# Patient Record
Sex: MALE | Race: WHITE | NOT HISPANIC OR LATINO | URBAN - METROPOLITAN AREA
[De-identification: names, ages, dates, MRNs, and addresses within clinical notes are randomized per-mention and may not be internally consistent; named-entity substitution may affect disease eponyms.]

---

## 2021-03-21 ENCOUNTER — OUTPATIENT (OUTPATIENT)
Dept: OUTPATIENT SERVICES | Facility: HOSPITAL | Age: 36
LOS: 1 days | End: 2021-03-21
Payer: COMMERCIAL

## 2021-03-21 DIAGNOSIS — Z11.52 ENCOUNTER FOR SCREENING FOR COVID-19: ICD-10-CM

## 2021-03-21 LAB — SARS-COV-2 RNA SPEC QL NAA+PROBE: SIGNIFICANT CHANGE UP

## 2021-03-21 PROCEDURE — C9803: CPT

## 2021-03-21 PROCEDURE — U0005: CPT

## 2021-03-21 PROCEDURE — U0003: CPT

## 2021-12-30 ENCOUNTER — TRANSCRIPTION ENCOUNTER (OUTPATIENT)
Age: 36
End: 2021-12-30

## 2023-02-22 ENCOUNTER — OFFICE (OUTPATIENT)
Dept: URBAN - METROPOLITAN AREA CLINIC 90 | Facility: CLINIC | Age: 38
Setting detail: OPHTHALMOLOGY
End: 2023-02-22
Payer: COMMERCIAL

## 2023-02-22 DIAGNOSIS — H16.221: ICD-10-CM

## 2023-02-22 PROCEDURE — 92002 INTRM OPH EXAM NEW PATIENT: CPT | Performed by: OPHTHALMOLOGY

## 2023-02-22 ASSESSMENT — REFRACTION_CURRENTRX
OS_SPHERE: -2.50
OS_CYLINDER: -0.50
OS_AXIS: 027
OD_SPHERE: -2.75
OD_OVR_VA: 20/
OD_VPRISM_DIRECTION: SV
OS_VPRISM_DIRECTION: SV
OS_OVR_VA: 20/

## 2023-02-22 ASSESSMENT — SUPERFICIAL PUNCTATE KERATITIS (SPK)
OD_SPK: 2+
OS_SPK: ABSENT

## 2023-02-22 ASSESSMENT — VISUAL ACUITY
OD_BCVA: 20/20
OS_BCVA: 20/20

## 2023-11-15 ENCOUNTER — EMERGENCY (EMERGENCY)
Facility: HOSPITAL | Age: 38
LOS: 1 days | Discharge: HOME | End: 2023-11-15
Attending: EMERGENCY MEDICINE
Payer: COMMERCIAL

## 2023-11-15 VITALS
TEMPERATURE: 98 F | RESPIRATION RATE: 20 BRPM | OXYGEN SATURATION: 97 % | DIASTOLIC BLOOD PRESSURE: 85 MMHG | SYSTOLIC BLOOD PRESSURE: 134 MMHG | WEIGHT: 210.1 LBS | HEART RATE: 70 BPM | HEIGHT: 75 IN

## 2023-11-15 VITALS
TEMPERATURE: 98 F | RESPIRATION RATE: 19 BRPM | SYSTOLIC BLOOD PRESSURE: 144 MMHG | OXYGEN SATURATION: 98 % | DIASTOLIC BLOOD PRESSURE: 89 MMHG | HEART RATE: 69 BPM

## 2023-11-15 PROCEDURE — 99285 EMERGENCY DEPT VISIT HI MDM: CPT

## 2023-11-15 PROCEDURE — 99284 EMERGENCY DEPT VISIT MOD MDM: CPT | Mod: 25

## 2023-11-15 PROCEDURE — 93971 EXTREMITY STUDY: CPT | Mod: 26,LT

## 2023-11-15 PROCEDURE — 93971 EXTREMITY STUDY: CPT

## 2023-11-15 PROCEDURE — 73590 X-RAY EXAM OF LOWER LEG: CPT | Mod: 26,LT

## 2023-11-15 PROCEDURE — 73590 X-RAY EXAM OF LOWER LEG: CPT

## 2023-11-15 NOTE — ED PROVIDER NOTE - PATIENT PORTAL LINK FT
You can access the FollowMyHealth Patient Portal offered by [facility] by registering at the following website: [email or web address]. By joining [employer]’s FollowMyHealth portal, you will also be able to view your health information using other applications (apps) compatible with our system.

## 2023-11-15 NOTE — ED PROCEDURE NOTE - ULTRASOUND FINDINGS
No signs of achilles tendon rupture, no regions of anechoicity along tendon/List any findings No signs of achilles tendon rupture, no regions of anechoicity along tendon. Upon re-examiation of calf, possible Thompson Allyssa lesion/List any findings

## 2023-11-15 NOTE — ED PROVIDER NOTE - DIFFERENTIAL DIAGNOSIS
muscle tear vs partial achilles tendon rupture (unlikely) vs avulsion fx vs dvt Differential Diagnosis

## 2023-11-15 NOTE — ED ADULT NURSE NOTE - OBJECTIVE STATEMENT
Patient c/o pain in left calf and foot s/p atraumatic injury 2 weeks ago. Patient states he was playing basketball when he had a sudden pain in L calf. Patient states he was starting to feel better but then yesterday tried running and felt pain again and this morning noticed redness in calf and bruising on L shin. Patient reports pain when walking. Denies sob, chest pain, fever, chills, n/v/d. Patient A&Ox4, ambulatory. No signs of acute distress at this time. Plan of care in progress.

## 2023-11-15 NOTE — ED PROCEDURE NOTE - ATTENDING APP SHARED VISIT CONTRIBUTION OF CARE
Dr. Seo: I performed a face to face bedside interview with patient regarding history of present illness, review of symptoms and past medical history. I completed an independent physical exam.  I have discussed patient's plan of care with PA.   I agree with note as stated above, having amended the EMR as needed to reflect my findings.   This includes HISTORY OF PRESENT ILLNESS, HIV, PAST MEDICAL/SURGICAL/FAMILY/SOCIAL HISTORY, ALLERGIES AND HOME MEDICATIONS, REVIEW OF SYSTEMS, PHYSICAL EXAM, and any PROGRESS NOTES during the time I functioned as the attending physician for this patient.

## 2023-11-15 NOTE — ED PROVIDER NOTE - OBJECTIVE STATEMENT
39 yo M w no PMHx presents to the ED c.o L sided calf pain for 2 weeks after playing basketball. Pt states he was playing basketball, made a lifting/jumping off motion when he noticed severe crampy, achy, sharp pain in 39 yo M w no PMHx presents to the ED c.o L sided calf pain for 2 weeks after playing basketball. Pt states he was playing basketball, made a lifting/jumping off motion when he noticed severe crampy, achy, sharp pain in left calf. Immediately went home, rested, iced, compression stocking with mild improvement of pain symptoms and was able to walk. On Sunday, attempted to run but severe pain returned in L calf, and this morning noticed associated bruising in anterior shin and calf prompting visit to ED. Painful when he dorsiflexes, but not painful with plantarflexion. Ibuprofen 600mg moderately improves pain. Denies fevers, chills, nausea, vomiting, dizziness, chest pain, SOB, abdominal pain, dysuria, hematuria.

## 2023-11-15 NOTE — ED PROVIDER NOTE - PROGRESS NOTE DETAILS
Alejandro Calderón MD: Signed out pending ultrasound.  Ultrasound read states patient with possible synovial cyst, however based on my clinical evaluation including physical exam and bedside ultrasound is more concerning for a significant fluid collection in between the medial gastrocnemius and the soleus, hematoma/seroma versus a Thompson Allyssa lesion.  I recommended for patient to obtain further advanced imaging including CT scan as well as obtain labs, but patient and mother bedside declined and would prefer to follow-up as an outpatient.  They state they can follow-up closely with a surgeon who would be able to manage this as well as IR to drain, as mother works for interventional radiology.  Patient and mother show that they have capacity to make medical decisions and are aware of the risks and consequences of leaving without definitive diagnosis and management.  Repeat examination shows patient has neurovascularly intact examination with brisk cap refill, equal pulses and normal sensation/motor. Patient adamant about going home at this time.

## 2023-11-15 NOTE — ED ADULT NURSE NOTE - NSFALLUNIVINTERV_ED_ALL_ED
Bed/Stretcher in lowest position, wheels locked, appropriate side rails in place/Call bell, personal items and telephone in reach/Instruct patient to call for assistance before getting out of bed/chair/stretcher/Non-slip footwear applied when patient is off stretcher/[place] to call system/Physically safe environment - no spills, clutter or unnecessary equipment/Purposeful proactive rounding/Room/bathroom lighting operational, light cord in reach

## 2023-11-15 NOTE — ED PROVIDER NOTE - CLINICAL SUMMARY MEDICAL DECISION MAKING FREE TEXT BOX
37 yo M w no PMHx presents to the ED c.o L sided calf pain for 2 weeks after playing basketball. Pt states he was playing basketball, made a lifting/jumping off motion when he noticed severe crampy, achy, sharp pain in left calf. Immediately went home, rested, iced, compression stocking with mild improvement of pain symptoms and was able to walk. On Sunday, attempted to run but severe pain returned in L calf, and this morning noticed associated bruising in anterior shin and calf prompting visit to ED. Painful when he dorsiflexes, but not painful with plantarflexion. Ibuprofen 600mg moderately improves pain. Denies fevers, chills, nausea, vomiting, dizziness, chest pain, SOB, abdominal pain, dysuria, hematuria. VSS. Clinically stable. Physical exam remarkable for anterior shin and posterior calf echymosis. Munoz's test negative b/l, no pitting edema, no calf length discrepancy, neurovascularly intact, muscle strength testing ankle and knee 5/5 b/l. Suspicion for partial achilles tear vs partial gastroc tear, low suspicion for DVT but will r/o DVT, will xray to r/o frac. Dispo likely DC w sports medicine clinic

## 2023-11-15 NOTE — ED PROVIDER NOTE - NSFOLLOWUPCLINICS_GEN_ALL_ED_FT
[facility] Orthopedic Surgery  Orthopedic Surgery  [address], 3rd & 4th [place] [address]  Phone: [phone]  Fax:     [facility]  General Surgery  [address], [facility] - [address]rd [place] [address]  Phone: [phone]  Fax:

## 2023-11-15 NOTE — ED PROVIDER NOTE - CARE PLAN
Principal Discharge DX:	Partial tear of left Achilles tendon   1 Principal Discharge DX:	Muscle tear

## 2023-11-15 NOTE — ED PROVIDER NOTE - ATTENDING CONTRIBUTION TO CARE
Dr. Seo: I have personally performed a face to face bedside history and physical examination of this patient. I have discussed the history, examination, review of systems, assessment and plan of management with the resident. I have reviewed the electronic medical record and amended it to reflect my history, review of systems, physical exam, assessment and plan.    Dr. Seo: 38-year-old male no past medical history, smokes cigarettes, presenting with left calf pain for the last 2 weeks after playing basketball.  No direct trauma to the left leg but states while he was playing basketball 2 weeks ago he jumped and noticed severe cramping with the left calf.  Did not feel a pop.  Patient rested his leg, iced it, provided compression and elevation but states symptoms persisted and noticed ecchymosis today so came in. No new trauma. Also noticed swelling in his leg and was worried about a blood clot.  Able to ambulate.  Accompanied by his mother who works here.  Gen: No acute distress  HEENT: Mucous membranes moist, pink conjunctivae, EOMI  CV: RRR, no clubbing/cyanosis/edema  Resp: CTAB  GI: Abdomen soft, NT, ND. Normal BS. No rebound, no guarding  : No CVAT  Neuro: A&O x 3, moving all 4 extremities  MSK: No spine or joint tenderness to palpation, normal Munoz test, no laxity, neg ant post drawer test  Skin: ecchymosis over left calf    Patient presenting with left calf pain status after basketball injury, no bony tenderness to palpation. We will get an x-ray rule out avulsion fracture.  Likely partially muscular tear.  Achilles tendon rupture unlikely.  DVT unlikely however given unilateral swelling we will get a duplex rule out DVT.

## 2023-11-15 NOTE — ED PROVIDER NOTE - PHYSICAL EXAMINATION
Gen: AAOx3, non-toxic  Head: NCAT  HEENT: EOMI, oral mucosa moist, normal conjunctiva  Lung: CTAB, no respiratory distress, no wheezes/rhonchi/rales B/L,   CV: RRR, no murmurs, rubs or gallops  Abd: soft, NTND, no guarding, no CVA tenderness  MSK: no visible deformities. neurovascularly intact, thompsons test negative, motor strength testing of ankle, knee 5/5 b/l  Neuro: No focal sensory or motor deficits  Skin: Warm, well perfused, no rash. diffuse echymosis on anterior and posterior shin, dp present  Psych: normal affect.

## 2023-11-15 NOTE — ED PROVIDER NOTE - NSFOLLOWUPINSTRUCTIONS_ED_ALL_ED_FT
The results of any blood tests and imaging studies completed during your visit today were discussed and explained to you and a copy provided with your discharge instructions. Please follow up with your the sports medicine clinic for further management. You will receive a call from our discharge center to help set up an appointment.       You may take 600 mg ibuprofen every 8 hours, with food, as needed for pain.  You can take tylenol and ibuprofen at the same time.     PLEASE RETURN TO ED FOR NEW OR WORSENING SYMPTOMS (intractable nausea/vomiting, severe bleeding, fever over 100.4F, loss of movement of one or more limbs, seizure) You were seen in the ED for calf pain. You were evaluated with an x-ray and duplex ultrasound which returned negative. No acute intervention is required in the ED. You may take 600 mg ibuprofen every 8 hours, with food, as needed for pain. You can take tylenol and ibuprofen at the same time.     The results of any blood tests and imaging studies completed during your visit today were discussed and explained to you and a copy provided with your discharge instructions. Please follow up with your the sports medicine clinic for further management. You will receive a call from our discharge center to help set up an appointment.      PLEASE RETURN TO ED FOR NEW OR WORSENING SYMPTOMS (intractable nausea/vomiting, severe bleeding, fever over 100.4F, loss of movement of one or more limbs, seizure, intractable calf pain, redness, swelling, increased bruising) You were seen in the ED for calf pain. You were evaluated with an x-ray which were negative. Negative.  He received duplex which returned negative for DVT but showed possible fluid collection.  Upon bedside ultrasound examination fluid collection suggest possible Thompson Allyssa lesion which would require a CT for further evaluation. Please followup with general surgeon or orthopedic surgeon for further management, as you discussed.     The results of any blood tests and imaging studies completed during your visit today were discussed and explained to you and a copy provided with your discharge instructions.       PLEASE RETURN TO ED FOR NEW OR WORSENING SYMPTOMS (intractable nausea/vomiting, severe bleeding, fever over 100.4F, loss of movement of one or more limbs, seizure, intractable calf pain, redness, swelling, increased bruising)

## 2024-01-26 ENCOUNTER — NON-APPOINTMENT (OUTPATIENT)
Age: 39
End: 2024-01-26

## 2025-01-10 ENCOUNTER — NON-APPOINTMENT (OUTPATIENT)
Age: 40
End: 2025-01-10

## 2025-01-14 ENCOUNTER — NON-APPOINTMENT (OUTPATIENT)
Age: 40
End: 2025-01-14

## 2025-01-14 ENCOUNTER — APPOINTMENT (OUTPATIENT)
Dept: ORTHOPEDIC SURGERY | Facility: CLINIC | Age: 40
End: 2025-01-14
Payer: COMMERCIAL

## 2025-01-14 VITALS — DIASTOLIC BLOOD PRESSURE: 84 MMHG | SYSTOLIC BLOOD PRESSURE: 136 MMHG | HEART RATE: 69 BPM

## 2025-01-14 VITALS — WEIGHT: 240 LBS | HEIGHT: 75 IN | BODY MASS INDEX: 29.84 KG/M2

## 2025-01-14 DIAGNOSIS — M25.562 PAIN IN LEFT KNEE: ICD-10-CM

## 2025-01-14 PROCEDURE — 99203 OFFICE O/P NEW LOW 30 MIN: CPT

## 2025-01-14 PROCEDURE — 73564 X-RAY EXAM KNEE 4 OR MORE: CPT | Mod: LT
